# Patient Record
(demographics unavailable — no encounter records)

---

## 2025-04-26 NOTE — DISCUSSION/SUMMARY
[de-identified] : 45 minutes was spent ordering tests, reviewing past office notes, examining the patient, discussing the clinical presentation and findings, communicating and explaining the diagnosis, ordering medication, providing orthopedic education and documenting the visit in the electronic medical record.  X-rays of both knees taken today reviewed.  She does have end-stage osteoarthritis which is greater right than left.  The patient reports of increasing difficulties with ambulation.  The patient would like to proceed with a knee replacement surgery as acetaminophen and use of a cane as well as activity modification no longer providing sustained relief.  Her last hemoglobin A1c which was about a month ago was 7.7.  She understands she needs to continue to maintain a hemoglobin A1c of less than 8 to proceed with surgery.  She understands that the swelling after surgery may exacerbate the lower leg lymphedema.  She understands that she will need to attend presurgical testing as well as joint replacement class.  The patient is a 74 year old individual with end stage arthritis of their right knee joint. Based upon the patient's continued symptoms and failure to respond to conservative treatment for more than three months including activity modification, use of a cane and acetaminophen, I have recommended a right total knee arthroplasty for this patient. A long discussion took place with the patient describing what a total joint replacement is and what the procedure would entail. A knee model similar to the implants that will be used during the operation was utilized to demonstrate and to discuss the implants. Implant fixation, use of cement, was also discussed with the patient. Choices of implant manufactures were discussed and reviewed with preference to be made by patient and surgeon prior to operation. The patient was informed that the primary implant company would be [employer]. If the patient wishses to have another implant company used, the patient will obtain a consultation from an orthopedic surgeon utilizing that brand implant.  Final selection to be based on customary practice as well as preoperative templating with selection confirmed intraoperatively based on patients anatomy. The patient participated and agreed with the decision-making process. The hospitalization and post-operative care and rehabilitation were also discussed. The use of perioperative antibiotics and DVT prophylaxis were discussed. The risk, benefits and alternatives to a surgical intervention were discussed at length with the patient. The patient was also advised of risks related to the medical comorbidities and elevated body mass index (BMI).  A lengthy discussion took place to review the most common complications including but not limited to: deep vein thrombosis, pulmonary embolus, heart attack, stroke, infection, wound breakdown, numbness, damage to nerves, tendon, muscles, arteries or other blood vessels, death and other possible complications from anesthesia. The patient was told that we will take steps to minimize these risks by using sterile technique, antibiotics and DVT prophylaxis when appropriate and follow the patient postoperatively in the office setting to monitor progress. The possibility of recurrent pain, no improvement in pain and actual worsening of pain were also discussed with the patient. The discharge plan of care focused on the patient going home following surgery.  The patient was encouraged to make the necessary arrangements to have someone stay with them when they are discharged home.  Following discharge, a home care nurse will visit the patient.  The home care nurse will open your home care case and request home physical therapy services.  Home physical therapy will commence following discharge provided it is appropriate and covered by the health insurance benefit plan.  The benefits of surgery were discussed with the patient including the potential for improving his/her current clinical condition through operative intervention. Alternatives to surgical intervention including continued conservative management were also discussed in detail. All questions were answered to the satisfaction of the patient. The treatment plan of care, as well as a model of a total knee equivalent to the one that will be used for their total joint replacement, was shared with the patient.  The patient participated and agreed to the plan of care as well as the use of the recommended implants for their total knee replacement surgery.
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
## 2025-04-26 NOTE — PHYSICAL EXAM
[de-identified] : The patient is conversive and in no apparent distress. The patient is alert and oriented to person, place, and time. Affect and mood appear normal. The head is normocephalic and atraumatic. Skin shows normal turgor with no evidence of eczema or psoriasis. No respiratory distress noted. Sensation grossly intact. MUSCULOSKELETAL:   SEE BELOW  Bilateral knee exam demonstrates skin is clean, dry intact.  No surgical scars.  No signs of acute trauma.  Normal temperature.  No large effusions.  Alignment appears to be -3 degree varus.  Motion of the left knee is 0 degrees of extension to 95 degrees of flexion which is limited by stiffness.  Left knee demonstrates range of motion 0 degrees of extension to approximately 85 degrees of flexion which demonstrates significant stiffness.  There is increased laxity of both knees with medial/lateral stress testing.  No extensor mechanism lag.  No flexion contracture.  Nonpitting soft tissue swelling distally consistent with lymphedema noted.  2+ DP pulses.  Normal distal sensation. [de-identified] : Three-view right knee x-rays demonstrate severe tricompartment degenerative changes.  Diffuse osteophyte formation.  Appears to be a depressed lateral tibial plateau.  No retained hardware.  Three-view left knee x-rays were reviewed.  Severe medial compartment joint space narrowing appreciated.  Posterior osteophytes appreciated.  Advanced patellofemoral joint space narrowing also appreciated.

## 2025-04-26 NOTE — END OF VISIT
[FreeTextEntry4] :  I, Grady Del Toro, acted solely as a scribe for Dr. Meg Lopez on 04/26/2025. [FreeTextEntry3] :  I, Meg Lopez, on 04/26/2025 personally performed the services described in the documentation, reviewed the documentation recorded by the scribe in my presence, and it accurately and completely records my words and actions.